# Patient Record
Sex: MALE | Race: WHITE | NOT HISPANIC OR LATINO | Employment: FULL TIME | URBAN - METROPOLITAN AREA
[De-identification: names, ages, dates, MRNs, and addresses within clinical notes are randomized per-mention and may not be internally consistent; named-entity substitution may affect disease eponyms.]

---

## 2017-05-15 ENCOUNTER — NON-PROVIDER VISIT (OUTPATIENT)
Dept: URGENT CARE | Facility: CLINIC | Age: 27
End: 2017-05-15

## 2017-05-15 DIAGNOSIS — Z02.1 PRE-EMPLOYMENT DRUG SCREENING: ICD-10-CM

## 2017-05-15 LAB
AMP AMPHETAMINE: NEGATIVE
COC COCAINE: NEGATIVE
INT CON NEG: NEGATIVE
INT CON POS: POSITIVE
OPI OPIATES: NEGATIVE
PCP PHENCYCLIDINE: NEGATIVE
POC DRUG COMMENT 753798-OCCUPATIONAL HEALTH: NORMAL
THC: NEGATIVE

## 2017-05-15 PROCEDURE — 80305 DRUG TEST PRSMV DIR OPT OBS: CPT | Performed by: PHYSICIAN ASSISTANT

## 2018-05-10 ENCOUNTER — NON-PROVIDER VISIT (OUTPATIENT)
Dept: OCCUPATIONAL MEDICINE | Facility: CLINIC | Age: 28
End: 2018-05-10

## 2018-05-10 DIAGNOSIS — Z02.1 PRE-EMPLOYMENT DRUG SCREENING: ICD-10-CM

## 2018-05-10 LAB
AMP AMPHETAMINE: NORMAL
COC COCAINE: NORMAL
INT CON NEG: NORMAL
INT CON POS: NORMAL
MET METHAMPHETAMINES: NORMAL
OPI OPIATES: NORMAL
PCP PHENCYCLIDINE: NORMAL
POC DRUG COMMENT 753798-OCCUPATIONAL HEALTH: NORMAL
THC: NORMAL

## 2018-05-10 PROCEDURE — 80305 DRUG TEST PRSMV DIR OPT OBS: CPT | Performed by: PREVENTIVE MEDICINE

## 2023-02-08 ENCOUNTER — OCCUPATIONAL MEDICINE (OUTPATIENT)
Dept: URGENT CARE | Facility: CLINIC | Age: 33
End: 2023-02-08
Payer: COMMERCIAL

## 2023-02-08 VITALS
HEIGHT: 66 IN | SYSTOLIC BLOOD PRESSURE: 132 MMHG | TEMPERATURE: 97.3 F | WEIGHT: 180 LBS | BODY MASS INDEX: 28.93 KG/M2 | DIASTOLIC BLOOD PRESSURE: 62 MMHG | HEART RATE: 82 BPM | OXYGEN SATURATION: 100 %
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
## 2023-02-08 DIAGNOSIS — Z02.1 PRE-EMPLOYMENT DRUG SCREENING: ICD-10-CM

## 2023-02-08 DIAGNOSIS — S61.432A PUNCTURE WOUND OF LEFT HAND, FOREIGN BODY PRESENCE UNSPECIFIED, INITIAL ENCOUNTER: ICD-10-CM

## 2023-02-08 LAB
AMP AMPHETAMINE: NORMAL
BREATH ALCOHOL COMMENT: NORMAL
COC COCAINE: NORMAL
INT CON NEG: NORMAL
INT CON POS: NORMAL
MET METHAMPHETAMINES: NORMAL
OPI OPIATES: NORMAL
PCP PHENCYCLIDINE: NORMAL
POC BREATHALIZER: 0 PERCENT (ref 0–0.01)
POC DRUG COMMENT 753798-OCCUPATIONAL HEALTH: NEGATIVE
THC: NORMAL

## 2023-02-08 PROCEDURE — 82075 ASSAY OF BREATH ETHANOL: CPT | Performed by: NURSE PRACTITIONER

## 2023-02-08 PROCEDURE — 99203 OFFICE O/P NEW LOW 30 MIN: CPT | Mod: 29 | Performed by: NURSE PRACTITIONER

## 2023-02-08 PROCEDURE — 80305 DRUG TEST PRSMV DIR OPT OBS: CPT | Performed by: NURSE PRACTITIONER

## 2023-02-08 RX ORDER — CEPHALEXIN 500 MG/1
500 CAPSULE ORAL 4 TIMES DAILY
Qty: 28 CAPSULE | Refills: 0 | Status: SHIPPED | OUTPATIENT
Start: 2023-02-08 | End: 2023-02-08 | Stop reason: SDUPTHER

## 2023-02-08 RX ORDER — CEPHALEXIN 500 MG/1
500 CAPSULE ORAL 4 TIMES DAILY
Qty: 28 CAPSULE | Refills: 0 | Status: SHIPPED | OUTPATIENT
Start: 2023-02-08 | End: 2023-02-15

## 2023-02-08 ASSESSMENT — ENCOUNTER SYMPTOMS
SENSORY CHANGE: 0
TINGLING: 0

## 2023-02-08 NOTE — LETTER
[facility] [address] [place] [address]  Phone:  [phone] - Fax:  [phone]   [facility] Progress Report and Disability Certification  Date of Service: 2/8/2023   No Show:  No  Date / Time of Next Visit: 2/11/2023 9 AM    Claim Information   Patient Name: Blaze Gray  Claim Number:     Employer: [employer]  Date of Injury: 2/8/2023     Insurer / TPA: [employer] Workers [employer]  ID / SSN:     Occupation: CONSTRUCTION  Diagnosis: Diagnoses of Puncture wound of left hand, foreign body presence unspecified, initial encounter and Work related injury were pertinent to this visit.    Medical Information   Related to Industrial Injury? Yes    Subjective Complaints:  DOI 02/08/23 @3pm   Initial wound care - washed it with running water and rubbing alcohol. Initially bleeding a lot.   He was drilling with a drill gun. New drill bit - holcomb head.  Drilling metal into wood.  The gun slipped and went into his left hand palm. . He has full mobility of his hand only limted by increasing swelling. Hemostasis present. Denies numbness/ tingling. Tdap: 2018    Objective Findings: VSS. Puncture wound to hyper thenar eminence to Left palm.  FROM to hand/ finger. Brisk cap refill.   Physical Exam  Vitals and nursing note reviewed.   Constitutional:       Appearance: Normal appearance.   Cardiovascular:      Rate and Rhythm: Normal rate.      Pulses: Normal pulses.   Musculoskeletal:      Left hand: Swelling, laceration (puncture wound -jagged) and tenderness present. Normal range of motion. There is no disruption of two-point discrimination. Normal capillary refill. Normal pulse.   Skin:     General: Skin is warm and dry.      Capillary Refill: Capillary refill takes less than 2 seconds.      Findings: Wound (puncture wound) present.  Jagged puncture wound edges.   Neurological:      Mental Status: He is alert and oriented  to person, place, and time.   Psychiatric:         Mood and Affect: Mood normal.         Behavior: Behavior normal.      Pre-Existing Condition(s): denies   Assessment:   Initial Visit    Status: Additional Care Required  Permanent Disability:No    Plan: Medication  Comments:antibiotics, work restrictions    Diagnostics:      Comments:       Disability Information   Status: Released to Full Duty    From:  2023  Through: 2023 Restrictions are: Temporary   Physical Restrictions   Sitting:    Standing:    Stooping:    Bending:      Squatting:    Walking:    Climbin hrs/day Pushing:  < or = to 1 hr/day   Pulling:  < or = to 1 hr/day Other:    Reaching Above Shoulder (L):   Reaching Above Shoulder (R):       Reaching Below Shoulder (L):    Reaching Below Shoulder (R):      Not to exceed Weight Limits   Carrying(hrs):   Weight Limit(lb): < or = to 10 pounds Lifting(hrs):   Weight  Limit(lb): < or = to 10 pounds   Comments: All restrictions are for left hand.     Repetitive Actions   Hands: i.e. Fine Manipulations from Grasping:     Feet: i.e. Operating Foot Controls:     Driving / Operate Machinery:     Health Care Provider’s Original or Electronic Signature  Liliya Solis, ASonaPSonaN. Health Care Provider’s Original or Electronic Signature    Cali Steinberg DO MPH     Clinic Name / Location: [address] [place] [address] Clinic Phone Number: Dept: [phone]   Appointment Time: 5:30 Pm Visit Start Time: 6:06 PM   Check-In Time:  5:32 Pm Visit Discharge Time:  07:00 PM   Original-Treating Physician or Chiropractor    Page 2-Insurer/TPA    Page 3-Employer    Page 4-Employee

## 2023-02-08 NOTE — LETTER
"EMPLOYEE’S CLAIM FOR COMPENSATION/ REPORT OF INITIAL TREATMENT  FORM C-4    EMPLOYEE’S CLAIM - PROVIDE ALL INFORMATION REQUESTED   First Name  Blaze Last Name  Isaac Birthdate                    1990                Sex  male Claim Number (Insurer’s Use Only)    Home Address  [address] Age  32 y.o. 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 (if applicable)  DRILLING    How did this injury or occupational disease occur? (Be specific an answer in detail. Use additional sheet if necessary)  WAS DRILLING AND DRILL GUN SLIPPED INTO HAND   If you believe that you have an occupational disease, when did you first have knowledge of the disability and it relationship to your employment?   Witnesses to the Accident  N/A      Nature of Injury or Occupational Disease  Workers' Compensation  Part(s) of Body Injured or Affected  Hand (L), N/A, N/A    I certify that the above is true and correct to the best of my knowledge and that I have provided this information in order to obtain the " benefits of [place]).  I hereby authorize any physician, chiropractor, surgeon, practitioner, or other person, any hospital, including [facility] or [facility], any medical service organization, any insurance company, or other institution or organization to release to each other, any medical or other information, including benefits paid or payable, pertinent to this injury or disease, except information relative to diagnosis, treatment and/or counseling for AIDS, psychological conditions, alcohol or controlled substances, for which I must give specific authorization.  A Photostat of this authorization shall be as valid as the original.     Date 02/08/2023   Place [facility]  Employee’s Original or  *Electronic Signature   THIS REPORT MUST BE COMPLETED AND MAILED WITHIN 3 WORKING DAYS OF TREATMENT   Place  [facility]  Name of Facility  [facility]   Date  2/8/2023 Diagnosis and Description of Injury or Occupational Disease  (S61.432A) Puncture wound of left hand, foreign body presence unspecified, initial encounter  (Y99.0) Work related injury Is there evidence the injured employee was under the influence of alcohol and/or another controlled substance at the time of accident?  ? No ? Yes (if yes, please explain)    Hour  6:06 PM   Diagnoses of Puncture wound of left hand, foreign body presence unspecified, initial encounter and Work related injury were pertinent to this visit. No   Treatment  Wound care, antibiotic   Have you advised the patient to remain off work five days or     more?    X-Ray Findings      ? Yes Indicate dates:   From   To      From information given by the employee, together with medical evidence, can        you directly connect this injury or occupational disease as job incurred?  Yes ? No If no, is the injured employee capable of:  ? full duty  No ? modified  "duty  Yes   Is additional medical care by a physician indicated?  Yes If Modified Duty, Specify any Limitations / Restrictions  See [place] D39    Do you know of any previous injury or disease contributing to this condition or occupational disease?  ? Yes ? No (Explain if yes)                          No   Date  2/8/2023 Print Health Care Provider's   VALERIE Wood I certify the employer’s copy of  this form was mailed on:   Address  [address] Insurer’s Use Only     [facility] Zip  [address]    Provider’s Tax ID Number  [id number] Telephone  Dept: [phone]             Health Care Provider’s Original or Electronic Signature  o-[id number] Cash Degree (MD,DO, DC,PAIqraC,APRN)   A.P.N.      * Complete and attach Release of Information (Form C-4A) when injured employee signs C-4 Form electronically  ORIGINAL - TREATING HEALTHCARE PROVIDER PAGE 2 - INSURER/TPA PAGE 3 - EMPLOYER PAGE 4 - EMPLOYEE             Form C-4 (rev.08/21)           BRIEF DESCRIPTION OF RIGHTS AND BENEFITS  (Pursuant to [place] 616C.050)    Notice of Injury or Occupational Disease (Incident Report Form C-1): If an injury or occupational disease (OD) arises out of and in the course of employment, you must provide written notice to your employer as soon as practicable, but no later than 7 days after the accident or OD. Your employer shall maintain a sufficient supply of the required forms.    Claim for Compensation (Form C-4): If medical treatment is sought, the form C-4 is available at the place of initial treatment. A completed \"Claim for Compensation\" (Form C-4) must be filed within 90 days after an accident or OD. The treating physician or chiropractor must, within 3 working days after treatment, complete and mail to the employer, the employer's insurer and third-party , the Claim for Compensation.    Medical Treatment: If you require medical treatment for your " on-the-job injury or OD, you may be required to select a physician or chiropractor from a list provided by your workers’ compensation insurer, if it has contracted with an Organization for Managed Care (MCO) or Preferred Provider Organization (PPO) or providers of health care. If your employer has not entered into a contract with an MCO or PPO, you may select a physician or chiropractor from the Panel of Physicians and Chiropractors. Any medical costs related to your industrial injury or OD will be paid by your insurer.    Temporary Total Disability (TTD): If your doctor has certified that you are unable to work for a period of at least 5 consecutive days, or 5 cumulative days in a 20-day period, or places restrictions on you that your employer does not accommodate, you may be entitled to TTD compensation.    Temporary Partial Disability (TPD): If the wage you receive upon reemployment is less than the compensation for TTD to which you are entitled, the insurer may be required to pay you TPD compensation to make up the difference. TPD can only be paid for a maximum of 24 months.    Permanent Partial Disability (PPD): When your medical condition is stable and there is an indication of a PPD as a result of your injury or OD, within 30 days, your insurer must arrange for an evaluation by a rating physician or chiropractor to determine the degree of your PPD. The amount of your PPD award depends on the date of injury, the results of the PPD evaluation, your age and wage.    Permanent Total Disability (PTD): If you are medically certified by a treating physician or chiropractor as permanently and totally disabled and have been granted a PTD status by your insurer, you are entitled to receive monthly benefits not to exceed 66 2/3% of your average monthly wage. The amount of your PTD payments is subject to reduction if you previously received a lump-sum PPD award.    Vocational Rehabilitation Services: You may be eligible  for vocational rehabilitation services if you are unable to return to the job due to a permanent physical impairment or permanent restrictions as a result of your injury or occupational disease.    Transportation and Per Elieser Reimbursement: You may be eligible for travel expenses and per elieser associated with medical treatment.    Reopening: You may be able to reopen your claim if your condition worsens after claim closure.     Appeal Process: If you disagree with a written determination issued by the insurer or the insurer does not respond to your request, you may appeal to the Department of Administration, , by following the instructions contained in your determination letter. You must appeal the determination within 70 days from the date of the determination letter at [address], [address], [place], [place] [address], or [address] [facility] [address], [place], [place] [address]. If you disagree with the  decision, you may appeal to the Department of Administration, . You must file your appeal within 30 days from the date of the  decision letter at [address], [address], [place], [place] [address], or [address], [address], [place], [place] [address]. If you disagree with a decision of an , you may file a petition for judicial review with the District Court. You must do so within 30 days of the Appeal Officer’s decision. You may be represented by an  at your own expense or you may contact the [facility] for possible representation.    [place]  for Injured Workers (NAIW): If you disagree with a  decision, you may request that NAIW represent you without charge at an  Hearing. For information regarding denial of benefits, you may contact the [facility] at: [address], [address], [place] [address], [phone], or [address], [place] [address],  [phone]    To File a Complaint with the Division: If you wish to file a complaint with the  of the Division of Industrial Relations (DIR),  please contact the Workers’ Compensation Section, [address], [address], [place], [place] [address], telephone [phone], or [address], [address], [place], [place] [address], telephone [phone].    For assistance with Workers’ Compensation Issues: You may contact the [facility] Office for Consumer Health Assistance, [address], [address], [place], [place] [address], Toll Free 1-805.532.6898, Web site: http://[facility].[place].gov/Programs/BRIDGET E-mail: bridget@[facility].nv.[facility]              __________________________________________________________________                                    _________________            Employee Name / Signature                                                                                                                            Date                                                                                                                                                                                                                              D-2 (rev. 10/20)

## 2023-02-09 NOTE — PROGRESS NOTES
"Subjective     Blaze Tremaine Gray is a 32 y.o. male who presents with Hand Laceration (Lt hand, as he was drilling, it slipped from hand, DOI: 2/08/23, tender)        Reviewed past medical, surgical and family history. Reviewed prescription and OTC medications with patient in electronic health record today  Allergies: Patient has no known allergies.          HPI  DOI 02/08/23   He was drilling with a drill gun. The gun slipped and went into his left hand palm. . He has full mobility of his hand only limted by increasing swelling. Hemostasis present. Denies numbness/ tingling. Tdap: 2018     Review of Systems   Musculoskeletal:  Negative for joint pain.   Neurological:  Negative for tingling and sensory change.            Objective     /62 (BP Location: Right arm, Patient Position: Sitting, BP Cuff Size: Adult long)   Pulse 82   Temp 36.3 °C (97.3 °F) (Temporal)   Ht 1.676 m (5' 6\")   Wt 81.6 kg (180 lb)   SpO2 100%   BMI 29.05 kg/m²      Physical Exam  Vitals and nursing note reviewed.   Constitutional:       Appearance: Normal appearance.   Cardiovascular:      Rate and Rhythm: Normal rate.      Pulses: Normal pulses.   Pulmonary:      Effort: Pulmonary effort is normal.      Breath sounds: Normal breath sounds.   Musculoskeletal:      Left hand: Swelling, laceration (puncture wound -jagged) and tenderness present. Normal range of motion. There is no disruption of two-point discrimination. Normal capillary refill. Normal pulse.   Skin:     General: Skin is warm and dry.      Capillary Refill: Capillary refill takes less than 2 seconds.      Findings: Wound (puncture wound) present.          Neurological:      Mental Status: He is alert and oriented to person, place, and time.   Psychiatric:         Mood and Affect: Mood normal.         Behavior: Behavior normal.         Thought Content: Thought content normal.         Judgment: Judgment normal.       VSS. Puncture wound to hyper thenar eminence to " Left palm.  FROM to hand/ finger. Brisk cap refill.       Wound was soaked in chlorohexidine and saline x 15 min.   No FB visible.   Dry telfa dressing applied with coban wrap.               Assessment & Plan        1. Puncture wound of left hand, foreign body presence unspecified, initial encounter  cephALEXin (KEFLEX) 500 MG Cap      2. Work related injury  cephALEXin (KEFLEX) 500 MG Cap            Educated in proper administration of  prescription medication(s) ordered today including safety, possible SE, risks, benefits, rationale and alternatives to therapy.   Ice  Elevation   The patient is alerted to watch for any signs of infection (redness, pus, pain, increased swelling or fever) and call if such occurs. Home wound care instructions are provided.       See [place] D39 and C4   Return for wound check as directed

## 2023-02-11 ENCOUNTER — OCCUPATIONAL MEDICINE (OUTPATIENT)
Dept: URGENT CARE | Facility: CLINIC | Age: 33
End: 2023-02-11
Payer: COMMERCIAL

## 2023-02-11 VITALS
OXYGEN SATURATION: 99 % | TEMPERATURE: 98.2 F | RESPIRATION RATE: 18 BRPM | DIASTOLIC BLOOD PRESSURE: 84 MMHG | HEIGHT: 66 IN | WEIGHT: 180 LBS | SYSTOLIC BLOOD PRESSURE: 138 MMHG | HEART RATE: 92 BPM | BODY MASS INDEX: 28.93 KG/M2

## 2023-02-11 DIAGNOSIS — S61.432D PUNCTURE WOUND OF LEFT HAND, FOREIGN BODY PRESENCE UNSPECIFIED, SUBSEQUENT ENCOUNTER: ICD-10-CM
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
## 2023-02-11 PROCEDURE — 99213 OFFICE O/P EST LOW 20 MIN: CPT | Performed by: PHYSICIAN ASSISTANT

## 2023-02-11 NOTE — LETTER
[facility] [address] [place] [address]  Phone:  [phone] - Fax:  [phone]   [facility] Progress Report and Disability Certification  Date of Service: 2/11/2023   No Show:  No  Date / Time of Next Visit: 02/18/2023 at 9 am    Claim Information   Patient Name: Blaze Gray  Claim Number:     Employer: [employer]  Date of Injury: 2/8/2023     Insurer / TPA: [employer]  ID / SSN:     Occupation: CONSTRUCTION  Diagnosis: Diagnoses of Puncture wound of left hand, foreign body presence unspecified, subsequent encounter and Work related injury were pertinent to this visit.    Medical Information   Related to Industrial Injury? Yes    Subjective Complaints:  DOI 02/08/23   He was drilling with a drill gun. The gun slipped and went into his left hand palm. . He has full mobility of his hand only limted by increasing swelling. Hemostasis present. Denies numbness/ tingling. Tdap: 2018   He sustained a puncture wound to hyper thenar eminence to Left palm.  FROM to hand/ finger. Brisk cap refill.  Wound was soaked in chlorohexidine and saline x 15 min. No FB visible.  Dry telfa dressing applied with coban wrap.  Patient was treated with Keflex.    Addendum 2/11/2023: Patient presents today for wound check.  He reports no wound related issues.  No fever chills or drainage from wound.  He has been keeping it covered appropriately at work.  He is taking antibiotics.  Pain is tolerable.  No numbness or tingling or weakness to the hand.   Objective Findings:  Left hand: Improved swelling, laceration (puncture wound -jagged) and mild tenderness present. Normal range of motion. There is no disruption of two-point discrimination. Normal capillary refill. Normal pulse.    Pre-Existing Condition(s):     Assessment:   Condition Improved    Status: Additional Care Required  Permanent Disability:No     Plan: Medication  Comments:Continue Keflex, Tylenol/ibuprofen OTC as needed for pain    Diagnostics:      Comments:       Disability Information   Status: Released to Restricted Duty    From:  2023  Through: 2/15/2023 Restrictions are: Temporary   Physical Restrictions   Sitting:    Standing:    Stooping:    Bending:      Squatting:    Walking:    Climbin hrs/day Pushin hrs/day   Pullin hrs/day Other:    Reaching Above Shoulder (L):   Reaching Above Shoulder (R):       Reaching Below Shoulder (L):    Reaching Below Shoulder (R):      Not to exceed Weight Limits   Carrying(hrs):   Weight Limit(lb): < or = to 10 pounds Lifting(hrs):   Weight  Limit(lb): < or = to 10 pounds   Comments: Restrictions per D39  Wound cleaned and dressed appropriately  No evidence of active infection  Complete Keflex as previously prescribed  Tylenol/ibuprofen as needed for pain  Keep wound covered clean and dry, wear gloves at all times work  Follow-up in 5 days  Return precautions discussed.    Repetitive Actions   Hands: i.e. Fine Manipulations from Grasping:     Feet: i.e. Operating Foot Controls:     Driving / Operate Machinery:     Health Care Provider’s Original or Electronic Signature  Poly Juan P.A.-C. Health Care Provider’s Original or Electronic Signature    Cali Steinberg DO MPH     Clinic Name / Location: [address] [place] [address] [place] [address] Clinic Phone Number: Dept: [phone]   Appointment Time: 9:30 Am Visit Start Time: 9:30 AM   Check-In Time:  9:17 Am Visit Discharge Time:  09:58 am    Original-Treating Physician or Chiropractor    Page 2-Insurer/TPA    Page 3-Employer    Page 4-Employee

## 2023-02-11 NOTE — PROGRESS NOTES
"Subjective     Blaze Tremaine Gray is a 32 y.o. male who presents with Follow-Up (LEFT hand laceration WC F/V )      DOI 02/08/23   He was drilling with a drill gun. The gun slipped and went into his left hand palm. . He has full mobility of his hand only limted by increasing swelling. Hemostasis present. Denies numbness/ tingling. Tdap: 2018   He sustained a puncture wound to hyper thenar eminence to Left palm.  FROM to hand/ finger. Brisk cap refill.  Wound was soaked in chlorohexidine and saline x 15 min. No FB visible.  Dry telfa dressing applied with coban wrap.  Patient was treated with Keflex.    Addendum 2/11/2023: Patient presents today for wound check.  He reports no wound related issues.  No fever chills or drainage from wound.  He has been keeping it covered appropriately at work.  He is taking antibiotics.  Pain is tolerable.  No numbness or tingling or weakness to the hand.                Objective     /84   Pulse 92   Temp 36.8 °C (98.2 °F) (Temporal)   Resp 18   Ht 1.676 m (5' 6\")   Wt 81.6 kg (180 lb)   SpO2 99%   BMI 29.05 kg/m²      Physical Exam  Vitals and nursing note reviewed.   Constitutional:       General: He is not in acute distress.     Appearance: Normal appearance. He is not ill-appearing.   HENT:      Head: Normocephalic.   Eyes:      Extraocular Movements: Extraocular movements intact.      Pupils: Pupils are equal, round, and reactive to light.   Cardiovascular:      Rate and Rhythm: Normal rate.   Pulmonary:      Effort: Pulmonary effort is normal.   Skin:     General: Skin is warm.      Findings: No rash.   Neurological:      Mental Status: He is alert and oriented to person, place, and time.   Psychiatric:         Thought Content: Thought content normal.         Judgment: Judgment normal.        Left hand: Improved swelling, laceration (puncture wound -jagged) and mild tenderness present. Normal range of motion. There is no disruption of two-point discrimination. " Normal capillary refill. Normal pulse.                        Assessment & Plan   1. Puncture wound of left hand, foreign body presence unspecified, subsequent encounter    2. Work related injury       Restrictions per D39  Wound cleaned and dressed appropriately  No evidence of active infection  Complete Keflex as previously prescribed  Tylenol/ibuprofen as needed for pain  Keep wound covered clean and dry, wear gloves at all times work  Follow-up in 5 days  Return precautions discussed.